# Patient Record
Sex: MALE | Race: WHITE | NOT HISPANIC OR LATINO | Employment: FULL TIME | URBAN - NONMETROPOLITAN AREA
[De-identification: names, ages, dates, MRNs, and addresses within clinical notes are randomized per-mention and may not be internally consistent; named-entity substitution may affect disease eponyms.]

---
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
## 2024-02-06 ENCOUNTER — OFFICE VISIT (OUTPATIENT)
Dept: FAMILY MEDICINE | Facility: CLINIC | Age: 19
End: 2024-02-06
Payer: COMMERCIAL

## 2024-02-06 VITALS
HEART RATE: 80 BPM | TEMPERATURE: 99 F | DIASTOLIC BLOOD PRESSURE: 72 MMHG | OXYGEN SATURATION: 98 % | WEIGHT: 202 LBS | HEIGHT: 67 IN | BODY MASS INDEX: 31.71 KG/M2 | SYSTOLIC BLOOD PRESSURE: 110 MMHG

## 2024-02-06 DIAGNOSIS — R09.81 SINUS CONGESTION: ICD-10-CM

## 2024-02-06 DIAGNOSIS — J06.9 ACUTE UPPER RESPIRATORY INFECTION: Primary | ICD-10-CM

## 2024-02-06 PROCEDURE — 1160F RVW MEDS BY RX/DR IN RCRD: CPT | Mod: CPTII,,, | Performed by: NURSE PRACTITIONER

## 2024-02-06 PROCEDURE — 3008F BODY MASS INDEX DOCD: CPT | Mod: CPTII,,, | Performed by: NURSE PRACTITIONER

## 2024-02-06 PROCEDURE — 3078F DIAST BP <80 MM HG: CPT | Mod: CPTII,,, | Performed by: NURSE PRACTITIONER

## 2024-02-06 PROCEDURE — 99213 OFFICE O/P EST LOW 20 MIN: CPT | Mod: ,,, | Performed by: NURSE PRACTITIONER

## 2024-02-06 PROCEDURE — 3074F SYST BP LT 130 MM HG: CPT | Mod: CPTII,,, | Performed by: NURSE PRACTITIONER

## 2024-02-06 PROCEDURE — 1159F MED LIST DOCD IN RCRD: CPT | Mod: CPTII,,, | Performed by: NURSE PRACTITIONER

## 2024-02-06 RX ORDER — FLUTICASONE PROPIONATE 50 MCG
1 SPRAY, SUSPENSION (ML) NASAL DAILY
Qty: 15.8 ML | Refills: 1 | Status: SHIPPED | OUTPATIENT
Start: 2024-02-06 | End: 2024-05-20

## 2024-02-06 RX ORDER — PREDNISONE 20 MG/1
40 TABLET ORAL DAILY
Qty: 6 TABLET | Refills: 0 | Status: SHIPPED | OUTPATIENT
Start: 2024-02-06 | End: 2024-02-09

## 2024-02-06 NOTE — PROGRESS NOTES
Patient ID: Hernan Trujillo  : 2005     Chief Complaint: Nasal Congestion (Ears clogged ) and Cough (X days)    Allergies: Patient is allergic to penicillin.     History of Present Illness:  The patient is a 18 y.o. White male who presents to clinic for evaluation and management with a chief complaint of Nasal Congestion (Ears clogged ) and Cough (X days)   Sinus Problem  This is a new problem. The current episode started in the past 7 days (day 4). The problem is unchanged. There has been no fever. Associated symptoms include congestion, coughing, sinus pressure, sneezing and a sore throat. Pertinent negatives include no chills, diaphoresis, ear pain, headaches, hoarse voice, neck pain, shortness of breath or swollen glands. Past treatments include nothing.        Past Medical History:  has no past medical history on file.    Social History:  reports that he has been smoking vaping with nicotine. He has never used smokeless tobacco. He reports that he does not currently use alcohol after a past usage of about 6.0 standard drinks of alcohol per week. He reports that he does not use drugs.    Care Team: Patient Care Team:  Nichole Story FNP-C as PCP - General (Family Medicine)  Saturnino Gaffney OD as Consulting Physician (Optometry)  Elmer Hampton MD (Orthopedic Surgery)  Giles Ruiz (Dental General Practice)     Current Medications:  Current Outpatient Medications   Medication Instructions    fluticasone propionate (FLONASE) 50 mcg, Each Nostril, Daily    predniSONE (DELTASONE) 40 mg, Oral, Daily       Review of Systems   Constitutional:  Negative for chills and diaphoresis.   HENT:  Positive for nasal congestion, sinus pressure/congestion, sneezing and sore throat. Negative for ear pain and hoarse voice.    Respiratory:  Positive for cough. Negative for shortness of breath.    Musculoskeletal:  Negative for neck pain.   Neurological:  Negative for headaches.        Visit Vitals  /72  "(BP Location: Right arm)   Pulse 80   Temp 98.6 °F (37 °C) (Temporal)   Ht 5' 6.54" (1.69 m)   Wt 91.6 kg (202 lb)   SpO2 98%   BMI 32.08 kg/m²       Physical Exam  Vitals reviewed.   Constitutional:       General: He is not in acute distress.     Appearance: Normal appearance. He is not ill-appearing.   HENT:      Right Ear: No tenderness. A middle ear effusion is present. Tympanic membrane is injected.      Left Ear: No tenderness. A middle ear effusion is present. Tympanic membrane is injected.      Ears:      Comments: Right ear:  TM with cloudy effusion and engorged vessels  Left TM: clear     Nose: Rhinorrhea present. Rhinorrhea is clear.      Mouth/Throat:      Pharynx: Posterior oropharyngeal erythema present. No oropharyngeal exudate.      Tonsils: No tonsillar exudate. 2+ on the right. 2+ on the left.   Cardiovascular:      Rate and Rhythm: Normal rate and regular rhythm.      Heart sounds: Normal heart sounds.   Pulmonary:      Effort: Pulmonary effort is normal.      Breath sounds: Normal breath sounds.   Musculoskeletal:      Cervical back: Normal range of motion and neck supple. No tenderness.   Lymphadenopathy:      Cervical: Cervical adenopathy present.   Skin:     General: Skin is warm and dry.   Neurological:      Mental Status: He is alert and oriented to person, place, and time. Mental status is at baseline.            Assessment & Plan:  1. Acute upper respiratory infection  Assessment & Plan:  Declines flu/covid swab. Discussed viral vs bacterial infections. Educated on proper technique for nasal sprays and to notify if symptoms worsen or fail to improve over next week or develop temperature greater than 101.Will call out antibiotics. Discussed symptomatic treatment with OTC medications. Encouraged rest and hydration. Patient states understanding.     Orders:  -     predniSONE (DELTASONE) 20 MG tablet; Take 2 tablets (40 mg total) by mouth once daily. for 3 days  Dispense: 6 tablet; Refill: " 0  -     fluticasone propionate (FLONASE) 50 mcg/actuation nasal spray; 1 spray (50 mcg total) by Each Nostril route once daily.  Dispense: 15.8 mL; Refill: 1    2. Sinus congestion         No future appointments.    Follow up if symptoms worsen or fail to improve, for Keep Apt as Scheduled. Call sooner if needed.    REGLA MARTIN

## 2024-02-06 NOTE — ASSESSMENT & PLAN NOTE
Declines flu/covid swab. Discussed viral vs bacterial infections. Educated on proper technique for nasal sprays and to notify if symptoms worsen or fail to improve over next week or develop temperature greater than 101.Will call out antibiotics. Discussed symptomatic treatment with OTC medications. Encouraged rest and hydration. Patient states understanding.

## 2024-02-09 ENCOUNTER — TELEPHONE (OUTPATIENT)
Dept: FAMILY MEDICINE | Facility: CLINIC | Age: 19
End: 2024-02-09
Payer: COMMERCIAL

## 2024-03-27 ENCOUNTER — HOSPITAL ENCOUNTER (OUTPATIENT)
Dept: RADIOLOGY | Facility: HOSPITAL | Age: 19
Discharge: HOME | End: 2024-03-27
Attending: FAMILY MEDICINE
Payer: COMMERCIAL

## 2024-03-27 ENCOUNTER — OFFICE VISIT (OUTPATIENT)
Dept: FAMILY MEDICINE | Facility: CLINIC | Age: 19
End: 2024-03-27
Payer: COMMERCIAL

## 2024-03-27 VITALS
HEART RATE: 89 BPM | OXYGEN SATURATION: 99 % | DIASTOLIC BLOOD PRESSURE: 68 MMHG | BODY MASS INDEX: 31.49 KG/M2 | HEIGHT: 67 IN | WEIGHT: 200.63 LBS | TEMPERATURE: 97 F | SYSTOLIC BLOOD PRESSURE: 124 MMHG

## 2024-03-27 DIAGNOSIS — L02.415 ABSCESS OF RIGHT LOWER LEG: Primary | ICD-10-CM

## 2024-03-27 DIAGNOSIS — S81.801A LEG WOUND, RIGHT, INITIAL ENCOUNTER: ICD-10-CM

## 2024-03-27 PROBLEM — J10.1 INFLUENZA A: Status: RESOLVED | Noted: 2023-11-06 | Resolved: 2024-03-27

## 2024-03-27 PROBLEM — J06.9 ACUTE UPPER RESPIRATORY INFECTION: Status: RESOLVED | Noted: 2024-02-06 | Resolved: 2024-03-27

## 2024-03-27 PROBLEM — J02.0 STREP PHARYNGITIS: Status: RESOLVED | Noted: 2023-11-06 | Resolved: 2024-03-27

## 2024-03-27 PROBLEM — L05.91 PILONIDAL CYST: Status: RESOLVED | Noted: 2023-09-18 | Resolved: 2024-03-27

## 2024-03-27 LAB
BASOPHILS # BLD AUTO: 0.04 X10(3)/MCL (ref 0.01–0.08)
BASOPHILS NFR BLD AUTO: 0.5 % (ref 0.1–1.2)
CRP SERPL-MCNC: 1.3 MG/DL (ref 0–0.9)
EOSINOPHIL # BLD AUTO: 0.1 X10(3)/MCL (ref 0.04–0.54)
EOSINOPHIL NFR BLD AUTO: 1.2 % (ref 0.7–7)
ERYTHROCYTE [DISTWIDTH] IN BLOOD BY AUTOMATED COUNT: 12.6 %
HCT VFR BLD AUTO: 45.4 % (ref 36–52)
HGB BLD-MCNC: 15.7 G/DL (ref 13–18)
IMM GRANULOCYTES # BLD AUTO: 0.03 X10(3)/MCL (ref 0–0.03)
IMM GRANULOCYTES NFR BLD AUTO: 0.4 % (ref 0–0.5)
LYMPHOCYTES # BLD AUTO: 1.54 X10(3)/MCL (ref 1.32–3.57)
LYMPHOCYTES NFR BLD AUTO: 19.1 % (ref 20–55)
MCH RBC QN AUTO: 29.9 PG (ref 27–34)
MCHC RBC AUTO-ENTMCNC: 34.6 G/DL (ref 31–37)
MCV RBC AUTO: 86.5 FL (ref 79–99)
MONOCYTES # BLD AUTO: 0.67 X10(3)/MCL (ref 0.3–0.82)
MONOCYTES NFR BLD AUTO: 8.3 % (ref 4.7–12.5)
NEUTROPHILS # BLD AUTO: 5.7 X10(3)/MCL (ref 1.78–5.38)
NEUTROPHILS NFR BLD AUTO: 70.5 % (ref 37–73)
NRBC BLD AUTO-RTO: 0 %
PLATELET # BLD AUTO: 248 X10(3)/MCL (ref 140–371)
PMV BLD AUTO: 11.8 FL (ref 9.4–12.4)
RBC # BLD AUTO: 5.25 X10(6)/MCL (ref 4–6)
WBC # SPEC AUTO: 8.08 X10(3)/MCL (ref 4–11.5)

## 2024-03-27 PROCEDURE — 1159F MED LIST DOCD IN RCRD: CPT | Mod: CPTII,,, | Performed by: FAMILY MEDICINE

## 2024-03-27 PROCEDURE — 3008F BODY MASS INDEX DOCD: CPT | Mod: CPTII,,, | Performed by: FAMILY MEDICINE

## 2024-03-27 PROCEDURE — 73590 X-RAY EXAM OF LOWER LEG: CPT | Mod: TC,RT

## 2024-03-27 PROCEDURE — 3074F SYST BP LT 130 MM HG: CPT | Mod: CPTII,,, | Performed by: FAMILY MEDICINE

## 2024-03-27 PROCEDURE — 1160F RVW MEDS BY RX/DR IN RCRD: CPT | Mod: CPTII,,, | Performed by: FAMILY MEDICINE

## 2024-03-27 PROCEDURE — 3078F DIAST BP <80 MM HG: CPT | Mod: CPTII,,, | Performed by: FAMILY MEDICINE

## 2024-03-27 PROCEDURE — 99214 OFFICE O/P EST MOD 30 MIN: CPT | Mod: ,,, | Performed by: FAMILY MEDICINE

## 2024-03-27 RX ORDER — SULFAMETHOXAZOLE AND TRIMETHOPRIM 800; 160 MG/1; MG/1
1 TABLET ORAL 2 TIMES DAILY
Qty: 20 TABLET | Refills: 0 | Status: SHIPPED | OUTPATIENT
Start: 2024-03-27 | End: 2024-04-06

## 2024-03-27 RX ORDER — DOXYCYCLINE 100 MG/1
100 CAPSULE ORAL EVERY 12 HOURS
Qty: 20 CAPSULE | Refills: 0 | Status: SHIPPED | OUTPATIENT
Start: 2024-03-27 | End: 2024-04-02 | Stop reason: SDUPTHER

## 2024-03-27 NOTE — PROGRESS NOTES
"SUBJECTIVE:  HPI    Hernan Trujillo is a 18 y.o. male here for wound right lower leg.  He developed an abrasion on the right anterior lower leg after he was kicked by a horse about 1 month ago.  The abrasion never completely healed.  Then, last night he had sudden onset of worsening pain in the anterior right leg.  This morning, he peeled the scab off and a copious amount of pus drained.  He has had continuous drainage from the wound ever since.  He denies any fever.  At the time of his injury, he was wearing some soil pants.      Pietros allergies, medications, history, and problem list were updated as appropriate.    ROS:  Pertinent ROS as above, otherwise negative    OBJECTIVE:  Vital signs  Visit Vitals  /68 (BP Location: Left arm, Patient Position: Sitting)   Pulse 89   Temp 97.2 °F (36.2 °C) (Oral)   Ht 5' 6.54" (1.69 m)   Wt 91 kg (200 lb 9.6 oz)   SpO2 99%   BMI 31.86 kg/m²          PHYSICAL EXAM:  General: Awake, alert, no acute distress  Right lower leg:  Anteriorly just below the tibial tuberosity is an open wound that probes about 2 cm in all directions and is draining mucopurulent serous drainage.  There is mild overlying erythema and induration.  No defined cellulitis.      ASSESSMENT/PLAN:  1. Abscess of right lower leg  -     sulfamethoxazole-trimethoprim 800-160mg (BACTRIM DS) 800-160 mg Tab; Take 1 tablet by mouth 2 (two) times daily. for 10 days  Dispense: 20 tablet; Refill: 0  -     doxycycline (VIBRAMYCIN) 100 MG Cap; Take 1 capsule (100 mg total) by mouth every 12 (twelve) hours. for 10 days  Dispense: 20 capsule; Refill: 0  -     CBC Auto Differential; Future; Expected date: 03/27/2024  -     C-Reactive Protein; Future; Expected date: 03/27/2024  -     Sedimentation rate; Future; Expected date: 03/27/2024  -     Wound Culture  -     X-Ray Tibia Fibula 2 View Right; Future; Expected date: 03/27/2024  -     Mycobacteria and Nocardia Culture    2. Leg wound, right, initial encounter  -     " sulfamethoxazole-trimethoprim 800-160mg (BACTRIM DS) 800-160 mg Tab; Take 1 tablet by mouth 2 (two) times daily. for 10 days  Dispense: 20 tablet; Refill: 0  -     doxycycline (VIBRAMYCIN) 100 MG Cap; Take 1 capsule (100 mg total) by mouth every 12 (twelve) hours. for 10 days  Dispense: 20 capsule; Refill: 0  -     CBC Auto Differential; Future; Expected date: 03/27/2024  -     C-Reactive Protein; Future; Expected date: 03/27/2024  -     Sedimentation rate; Future; Expected date: 03/27/2024  -     Wound Culture  -     X-Ray Tibia Fibula 2 View Right; Future; Expected date: 03/27/2024  -     Mycobacteria and Nocardia Culture        Follow Up:  Follow up in about 5 days (around 4/1/2024) for Right leg wound.

## 2024-03-28 ENCOUNTER — TELEPHONE (OUTPATIENT)
Dept: FAMILY MEDICINE | Facility: CLINIC | Age: 19
End: 2024-03-28
Payer: COMMERCIAL

## 2024-03-28 LAB — ERYTHROCYTE [SEDIMENTATION RATE] IN BLOOD: 10 MM/HR (ref 0–15)

## 2024-04-01 LAB
BACTERIA WND CULT: ABNORMAL
BACTERIA WND CULT: ABNORMAL

## 2024-04-02 ENCOUNTER — OFFICE VISIT (OUTPATIENT)
Dept: FAMILY MEDICINE | Facility: CLINIC | Age: 19
End: 2024-04-02
Payer: COMMERCIAL

## 2024-04-02 VITALS
DIASTOLIC BLOOD PRESSURE: 62 MMHG | OXYGEN SATURATION: 97 % | HEIGHT: 67 IN | TEMPERATURE: 98 F | BODY MASS INDEX: 31.45 KG/M2 | WEIGHT: 200.38 LBS | HEART RATE: 87 BPM | SYSTOLIC BLOOD PRESSURE: 110 MMHG

## 2024-04-02 DIAGNOSIS — L02.415 ABSCESS OF RIGHT LOWER LEG: Primary | ICD-10-CM

## 2024-04-02 DIAGNOSIS — S81.801D LEG WOUND, RIGHT, SUBSEQUENT ENCOUNTER: ICD-10-CM

## 2024-04-02 PROCEDURE — 3078F DIAST BP <80 MM HG: CPT | Mod: CPTII,,, | Performed by: FAMILY MEDICINE

## 2024-04-02 PROCEDURE — 99213 OFFICE O/P EST LOW 20 MIN: CPT | Mod: ,,, | Performed by: FAMILY MEDICINE

## 2024-04-02 PROCEDURE — 3074F SYST BP LT 130 MM HG: CPT | Mod: CPTII,,, | Performed by: FAMILY MEDICINE

## 2024-04-02 PROCEDURE — 1159F MED LIST DOCD IN RCRD: CPT | Mod: CPTII,,, | Performed by: FAMILY MEDICINE

## 2024-04-02 PROCEDURE — 3008F BODY MASS INDEX DOCD: CPT | Mod: CPTII,,, | Performed by: FAMILY MEDICINE

## 2024-04-02 PROCEDURE — 1160F RVW MEDS BY RX/DR IN RCRD: CPT | Mod: CPTII,,, | Performed by: FAMILY MEDICINE

## 2024-04-02 NOTE — PROGRESS NOTES
"SUBJECTIVE:  HPI    Hernan Trujillo is a 18 y.o. male here for Follow-up (Right leg wound).  Here for follow-up on right leg wound.  No further drainage 48 hours after he was last seen.  No fever.  No redness.  The pain and swelling have decreased.      Pietros allergies, medications, history, and problem list were updated as appropriate.    ROS:  Pertinent ROS as above, otherwise negative    OBJECTIVE:  Vital signs  Visit Vitals  /62 (BP Location: Right arm, Patient Position: Sitting)   Pulse 87   Temp 98.4 °F (36.9 °C) (Temporal)   Ht 5' 6.54" (1.69 m)   Wt 90.9 kg (200 lb 6.4 oz)   SpO2 97%   BMI 31.83 kg/m²          PHYSICAL EXAM:  Right lower leg, just distal to the knee anteriorly:  Complete resolution of induration and fluctuance.  There is no erythema.  The wound still has an opening but is closing    March 27, 2024 culture:  Many methicillin sensitive staph aureus and strep pyogenes, group A; sensitivity noted    ASSESSMENT/PLAN:  1. Abscess of right lower leg    2. Leg wound, right, subsequent encounter    May discontinue doxycycline but complete course of Bactrim therapy    Return to clinic for any persistent problems  "

## 2024-05-11 LAB — CEFTIBUTEN ISLT MIC: NORMAL

## 2024-05-20 ENCOUNTER — OFFICE VISIT (OUTPATIENT)
Dept: FAMILY MEDICINE | Facility: CLINIC | Age: 19
End: 2024-05-20
Payer: COMMERCIAL

## 2024-05-20 VITALS
WEIGHT: 196 LBS | BODY MASS INDEX: 30.76 KG/M2 | TEMPERATURE: 98 F | OXYGEN SATURATION: 97 % | SYSTOLIC BLOOD PRESSURE: 118 MMHG | DIASTOLIC BLOOD PRESSURE: 72 MMHG | HEIGHT: 67 IN | HEART RATE: 74 BPM

## 2024-05-20 DIAGNOSIS — L23.7 POISON IVY DERMATITIS: Primary | ICD-10-CM

## 2024-05-20 PROCEDURE — 96372 THER/PROPH/DIAG INJ SC/IM: CPT | Mod: ,,, | Performed by: NURSE PRACTITIONER

## 2024-05-20 PROCEDURE — 3078F DIAST BP <80 MM HG: CPT | Mod: CPTII,,, | Performed by: NURSE PRACTITIONER

## 2024-05-20 PROCEDURE — 1159F MED LIST DOCD IN RCRD: CPT | Mod: CPTII,,, | Performed by: NURSE PRACTITIONER

## 2024-05-20 PROCEDURE — 3008F BODY MASS INDEX DOCD: CPT | Mod: CPTII,,, | Performed by: NURSE PRACTITIONER

## 2024-05-20 PROCEDURE — 1160F RVW MEDS BY RX/DR IN RCRD: CPT | Mod: CPTII,,, | Performed by: NURSE PRACTITIONER

## 2024-05-20 PROCEDURE — 3074F SYST BP LT 130 MM HG: CPT | Mod: CPTII,,, | Performed by: NURSE PRACTITIONER

## 2024-05-20 PROCEDURE — 99213 OFFICE O/P EST LOW 20 MIN: CPT | Mod: 25,,, | Performed by: NURSE PRACTITIONER

## 2024-05-20 RX ORDER — DEXAMETHASONE SODIUM PHOSPHATE 100 MG/10ML
10 INJECTION INTRAMUSCULAR; INTRAVENOUS
Status: COMPLETED | OUTPATIENT
Start: 2024-05-20 | End: 2024-05-20

## 2024-05-20 RX ORDER — PREDNISONE 20 MG/1
TABLET ORAL
Qty: 14 TABLET | Refills: 0 | Status: SHIPPED | OUTPATIENT
Start: 2024-05-20 | End: 2024-05-31

## 2024-05-20 RX ORDER — DEXAMETHASONE SODIUM PHOSPHATE 4 MG/ML
10 INJECTION, SOLUTION INTRA-ARTICULAR; INTRALESIONAL; INTRAMUSCULAR; INTRAVENOUS; SOFT TISSUE
Status: DISCONTINUED | OUTPATIENT
Start: 2024-05-20 | End: 2024-05-20

## 2024-05-20 RX ADMIN — DEXAMETHASONE SODIUM PHOSPHATE 10 MG: 100 INJECTION INTRAMUSCULAR; INTRAVENOUS at 03:05
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
## 2024-11-07 NOTE — PROGRESS NOTES
"SUBJECTIVE:  HPI    Hernan Trujillo is a 18 y.o. male here for 1 day follow up-cyst.  No fever since last night when it was 100.2.  The packing fell out today when he was taking a bath.  He feels tremendously better today.  Much less pain.  He is not ill-appearing or feeling today.      Pietros allergies, medications, history, and problem list were updated as appropriate.    ROS:  Pertinent ROS as above, otherwise negative    OBJECTIVE:  Vital signs  Visit Vitals  /66 (BP Location: Left arm, Patient Position: Sitting)   Pulse 102   Temp 98.5 °F (36.9 °C) (Temporal)   Ht 5' 5.75" (1.67 m)   Wt 89.1 kg (196 lb 6.4 oz)   SpO2 98%   BMI 31.94 kg/m²          PHYSICAL EXAM:  Left-sided pilonidal cyst incision is clean.  The induration, erythema, tenderness has almost completely resolved.  The packing was changed today.  The wound tunnels medially and distally.      ASSESSMENT/PLAN:  1. Pilonidal cyst  Assessment & Plan:  Daily packing changes at home     Complete cefdinir and clindamycin     Return to clinic for any persistent symptoms     If recurs, to surgery for excision            " Attempted to call patient contacts. Ineffective. Asked patient for correct number for his mother. Number given did not work.